# Patient Record
Sex: FEMALE | URBAN - METROPOLITAN AREA
[De-identification: names, ages, dates, MRNs, and addresses within clinical notes are randomized per-mention and may not be internally consistent; named-entity substitution may affect disease eponyms.]

---

## 2018-10-20 ENCOUNTER — OFFICE VISIT (OUTPATIENT)
Dept: OTOLARYNGOLOGY | Facility: CLINIC | Age: 38
End: 2018-10-20

## 2018-10-20 VITALS
WEIGHT: 128 LBS | TEMPERATURE: 99 F | SYSTOLIC BLOOD PRESSURE: 93 MMHG | DIASTOLIC BLOOD PRESSURE: 65 MMHG | HEIGHT: 60 IN | BODY MASS INDEX: 25.13 KG/M2

## 2018-10-20 DIAGNOSIS — R42 DIZZINESS: Primary | ICD-10-CM

## 2018-10-20 PROCEDURE — 99212 OFFICE O/P EST SF 10 MIN: CPT | Performed by: OTOLARYNGOLOGY

## 2018-10-20 PROCEDURE — 99243 OFF/OP CNSLTJ NEW/EST LOW 30: CPT | Performed by: OTOLARYNGOLOGY

## 2018-10-20 RX ORDER — OMEPRAZOLE 20 MG/1
CAPSULE, DELAYED RELEASE ORAL
Refills: 0 | COMMUNITY
Start: 2018-06-13

## 2018-10-20 RX ORDER — LORATADINE 10 MG/1
TABLET ORAL
Refills: 3 | COMMUNITY
Start: 2018-05-07

## 2018-10-20 RX ORDER — IBUPROFEN 600 MG/1
TABLET ORAL
Refills: 0 | COMMUNITY
Start: 2018-10-16

## 2018-10-20 NOTE — PROGRESS NOTES
Rob Ospina is a 41year old female. Patient presents with:  Dizziness: for 1 month, usually occurs every other day       HISTORY OF PRESENT ILLNESS  She presents with a one month history of discomfort of her right ear and dizziness.  At times she actual Laterality Date   •       x2         REVIEW OF SYSTEMS    System Neg/Pos Details   Constitutional Negative Fatigue, fever and weight loss. ENMT Negative Drooling. Eyes Negative Blurred vision and vision changes.    Respiratory Negative Dyspnea -Normal  Turbinates - Right: Normal, Left: Normal.       Current Outpatient Medications:   •  loratadine 10 MG Oral Tab, TK 1 T PO QD, Disp: , Rfl: 3  •  ibuprofen 600 MG Oral Tab, , Disp: , Rfl: 0  •  omeprazole 20 MG Oral Capsule Delayed Release, TK 1 C

## 2018-10-24 ENCOUNTER — TELEPHONE (OUTPATIENT)
Dept: OTOLARYNGOLOGY | Facility: CLINIC | Age: 38
End: 2018-10-24

## 2019-03-01 ENCOUNTER — TELEPHONE (OUTPATIENT)
Dept: OTOLARYNGOLOGY | Facility: CLINIC | Age: 39
End: 2019-03-01

## 2019-03-01 NOTE — TELEPHONE ENCOUNTER
Left message for pt to call back to schedule an office visit with Dr Kandice Calderon to go over the results of recent audiogram and VNG done at [facility].

## 2019-03-21 ENCOUNTER — OFFICE VISIT (OUTPATIENT)
Dept: OTOLARYNGOLOGY | Facility: CLINIC | Age: 39
End: 2019-03-21
Payer: COMMERCIAL

## 2019-03-21 VITALS
SYSTOLIC BLOOD PRESSURE: 93 MMHG | BODY MASS INDEX: 25.52 KG/M2 | DIASTOLIC BLOOD PRESSURE: 59 MMHG | HEIGHT: 60 IN | WEIGHT: 130 LBS | HEART RATE: 74 BPM

## 2019-03-21 DIAGNOSIS — R42 DIZZINESS: Primary | ICD-10-CM

## 2019-03-21 PROCEDURE — 99214 OFFICE O/P EST MOD 30 MIN: CPT | Performed by: OTOLARYNGOLOGY

## 2019-03-21 PROCEDURE — 99212 OFFICE O/P EST SF 10 MIN: CPT | Performed by: OTOLARYNGOLOGY

## 2019-03-21 RX ORDER — CELECOXIB 200 MG/1
200 CAPSULE ORAL DAILY PRN
Qty: 30 CAPSULE | Refills: 0 | Status: SHIPPED | OUTPATIENT
Start: 2019-03-21 | End: 2019-04-20

## 2019-03-22 NOTE — PROGRESS NOTES
Vamshi Tylor is a 46year old female. Patient presents with:  Hearing Loss: f/u regarding audiogram completed at [place] [id number]    She presents with a one month history of discomfort of her right ear and dizziness.  At times she Neg/Pos Details   Constitutional Negative Fatigue, fever and weight loss. ENMT Negative Drooling. Eyes Negative Blurred vision and vision changes. Respiratory Negative Dyspnea and wheezing.    Cardio Negative Chest pain, irregular heartbeat/palpitatio 200 MG Oral Cap, Take 1 capsule (200 mg total) by mouth daily as needed for Pain., Disp: 30 capsule, Rfl: 0  •  ibuprofen 600 MG Oral Tab, , Disp: , Rfl: 0  •  PROCTOZONE-HC 2.5 % Rectal Cream, , Disp: , Rfl: 1  •  omeprazole 20 MG Oral Capsule Delayed Rel

## 2024-02-08 ENCOUNTER — OFFICE VISIT (OUTPATIENT)
Dept: OTOLARYNGOLOGY | Facility: CLINIC | Age: 44
End: 2024-02-08

## 2024-02-08 ENCOUNTER — OFFICE VISIT (OUTPATIENT)
Dept: AUDIOLOGY | Facility: CLINIC | Age: 44
End: 2024-02-08

## 2024-02-08 DIAGNOSIS — R42 DIZZINESS: Primary | ICD-10-CM

## 2024-02-08 PROCEDURE — 99203 OFFICE O/P NEW LOW 30 MIN: CPT | Performed by: OTOLARYNGOLOGY

## 2024-02-08 RX ORDER — CELECOXIB 200 MG/1
200 CAPSULE ORAL DAILY PRN
Qty: 30 CAPSULE | Refills: 0 | Status: SHIPPED | OUTPATIENT
Start: 2024-02-08 | End: 2024-03-09

## 2024-02-08 RX ORDER — CYCLOBENZAPRINE HCL 5 MG
5 TABLET ORAL NIGHTLY
Qty: 30 TABLET | Refills: 1 | Status: SHIPPED | OUTPATIENT
Start: 2024-02-08

## 2024-02-08 NOTE — PROGRESS NOTES
Loida Granados is a 43 year old female.    Chief Complaint   Patient presents with    Dizziness     Patient is here due to dizziness        HISTORY OF PRESENT ILLNESS    She presents with a one month history of discomfort of her right ear and dizziness. At times she actually will have ear pain that is sharp and short lived. She does have jaw related discomfort at times when she chews food. She admits to fullness in her ear as well. Hearing loss? She notes no  muffling or alteration of sound. She was seen by her LMD and noted to have an abnormality of her eardrum.      10/20/18 she presents with a 1-2-month history of recurring episodes of spinning vertigo which last for 15 minutes or so and resolve spontaneously.  She does note some type of click or pop in her right ear on occasion associated with her imbalance.  No other signs, symptoms or complaints.  I previously saw her back in 2012 and at that time with suspected some level of TMJ involvement with her ear discomfort.  TMJ issues?     3/21/19 underwent hearing test and balance study at [facility] demonstrating normal hearing at all frequencies and a slight asymmetry of 17% on calorics on the left.  Not thought to be significant.  She does note that when she is stressed free that her imbalance is essentially gone.  No other signs, symptoms or complaints at this time.     2/8/24 I last saw her in 2019.  I suspect that some TMJ related issues.  Still having similar symptoms at this time.  Same goes.  Sometimes she will have significant pressure in her ears.  Happened once when she was flying and on descent.  Eustachian tube dysfunction?  Denies grinding clenching behavior.    Social History     Socioeconomic History    Marital status:    Tobacco Use    Smoking status: Never    Smokeless tobacco: Never   Vaping Use    Vaping Use: Never used   Substance and Sexual Activity    Alcohol use: No    Drug use: No       History reviewed. No pertinent family  history.    History reviewed. No pertinent past medical history.    Past Surgical History:   Procedure Laterality Date          x2         REVIEW OF SYSTEMS    System Neg/Pos Details   Constitutional Negative Fatigue, fever and weight loss.   ENMT Negative Drooling.   Eyes Negative Blurred vision and vision changes.   Respiratory Negative Dyspnea and wheezing.   Cardio Negative Chest pain, irregular heartbeat/palpitations and syncope.   GI Negative Abdominal pain and diarrhea.   Endocrine Negative Cold intolerance and heat intolerance.   Neuro Negative Tremors.   Psych Negative Anxiety and depression.   Integumentary Negative Frequent skin infections, pigment change and rash.   Hema/Lymph Negative Easy bleeding and easy bruising.           PHYSICAL EXAM    There were no vitals taken for this visit.       Constitutional Normal Overall appearance - Normal.   Psychiatric Normal Orientation - Oriented to time, place, person & situation. Appropriate mood and affect.   Neck Exam Normal Inspection - Normal. Palpation - Normal. Parotid gland - Normal. Thyroid gland - Normal.   Eyes Normal Conjunctiva - Right: Normal, Left: Normal. Pupil - Right: Normal, Left: Normal. Fundus - Right: Normal, Left: Normal.   Neurological Normal Memory - Normal. Cranial nerves - Cranial nerves II through XII grossly intact.   Head/Face Normal Facial features - Normal. Eyebrows - Normal. Skull - Normal.        Nasopharynx Normal External nose - Normal. Lips/teeth/gums - Normal. Tonsils - Normal. Oropharynx - Normal.   Ears Normal Inspection - Right: Normal, Left: Normal. Canal - Right: Normal, Left: Normal. TM - Right: Normal, Left: Normal.   Skin Normal Inspection - Normal.   TMJ  Tender to palpation bilaterally left greater than right   Lymph Detail Normal Submental. Submandibular. Anterior cervical. Posterior cervical. Supraclavicular.        Nose/Mouth/Throat Normal External nose - Normal. Lips/teeth/gums - Normal. Tonsils -  Normal. Oropharynx - Normal.   Nose/Mouth/Throat Normal Nares - Right: Normal Left: Normal. Septum -Normal  Turbinates - Right: Normal, Left: Normal.       Current Outpatient Medications:     celecoxib 200 MG Oral Cap, Take 1 capsule (200 mg total) by mouth daily as needed for Pain., Disp: 30 capsule, Rfl: 0    cyclobenzaprine 5 MG Oral Tab, Take 1 tablet (5 mg total) by mouth nightly., Disp: 30 tablet, Rfl: 1    ibuprofen 600 MG Oral Tab, , Disp: , Rfl: 0    omeprazole 20 MG Oral Capsule Delayed Release, TK 1 C PO QD, Disp: , Rfl: 0    loratadine 10 MG Oral Tab, TK 1 T PO QD, Disp: , Rfl: 3    PROCTOZONE-HC 2.5 % Rectal Cream, , Disp: , Rfl: 1  ASSESSMENT AND PLAN    1. Dizziness  I do once again suspect that she has some TMJ issues.  Abnormal movement of the jaw with mouth opening.  Seems to defer to the left before closure.  Does have tenderness to her TMJ to palpation on the left as well.  Start Celebrex cyclobenzaprine warm heat soft diet chewing both sides of mouth and avoid gum and crunchy items.  - Audiology Referral - [place] ([facility])        This note was prepared using Dragon Medical voice recognition dictation software. As a result errors may occur. When identified these errors have been corrected. While every attempt is made to correct errors during dictation discrepancies may still exist    Tim Trent MD    2/8/2024    11:29 AM

## 2024-03-07 ENCOUNTER — OFFICE VISIT (OUTPATIENT)
Dept: OTOLARYNGOLOGY | Facility: CLINIC | Age: 44
End: 2024-03-07

## 2024-03-07 VITALS — WEIGHT: 135 LBS | BODY MASS INDEX: 26.5 KG/M2 | HEIGHT: 60 IN

## 2024-03-07 DIAGNOSIS — H93.8X2 EAR PRESSURE, LEFT: Primary | ICD-10-CM

## 2024-03-07 PROCEDURE — 99213 OFFICE O/P EST LOW 20 MIN: CPT | Performed by: OTOLARYNGOLOGY

## 2024-03-07 RX ORDER — CYCLOBENZAPRINE HCL 5 MG
5 TABLET ORAL NIGHTLY
Qty: 30 TABLET | Refills: 1 | Status: SHIPPED | OUTPATIENT
Start: 2024-03-07

## 2024-03-07 RX ORDER — CELECOXIB 200 MG/1
200 CAPSULE ORAL DAILY PRN
Qty: 30 CAPSULE | Refills: 1 | Status: SHIPPED | OUTPATIENT
Start: 2024-03-07 | End: 2024-05-06

## 2024-03-07 RX ORDER — CETIRIZINE HYDROCHLORIDE 10 MG/1
10 TABLET ORAL DAILY
COMMUNITY

## 2024-03-07 NOTE — PROGRESS NOTES
Loida Granados is a 43 year old female.    Chief Complaint   Patient presents with    Follow - Up     F/up dizziness  Pt reports dizziness has decreased but ear still feels clogged       HISTORY OF PRESENT ILLNESS  She presents with a one month history of discomfort of her right ear and dizziness. At times she actually will have ear pain that is sharp and short lived. She does have jaw related discomfort at times when she chews food. She admits to fullness in her ear as well. Hearing loss? She notes no  muffling or alteration of sound. She was seen by her LMD and noted to have an abnormality of her eardrum.      10/20/18 she presents with a 1-2-month history of recurring episodes of spinning vertigo which last for 15 minutes or so and resolve spontaneously.  She does note some type of click or pop in her right ear on occasion associated with her imbalance.  No other signs, symptoms or complaints.  I previously saw her back in 2012 and at that time with suspected some level of TMJ involvement with her ear discomfort.  TMJ issues?     3/21/19 underwent hearing test and balance study at [facility] demonstrating normal hearing at all frequencies and a slight asymmetry of 17% on calorics on the left.  Not thought to be significant.  She does note that when she is stressed free that her imbalance is essentially gone.  No other signs, symptoms or complaints at this time.     2/8/24 I last saw her in 2019.  I suspect that some TMJ related issues.  Still having similar symptoms at this time.  Same goes.  Sometimes she will have significant pressure in her ears.  Happened once when she was flying and on descent.  Eustachian tube dysfunction?  Denies grinding clenching behavior.    3/7/24 last visit she was started on Celebrex cyclobenzaprine and notes 75% improvement in her imbalance only complaining of some persistent fullness in her ear on the left.  Denies clenching during the daytime but she is unsure if she grinds or  clenches while she sleeps.  Has not seen a dentist regarding this.  Does not wear a bite guard.  Sees a neurologist and has a negative MRI of the brain other than some microvascular changes most likely consistent with chronic headaches.      Social History     Socioeconomic History    Marital status:    Tobacco Use    Smoking status: Never    Smokeless tobacco: Never   Vaping Use    Vaping Use: Never used   Substance and Sexual Activity    Alcohol use: No    Drug use: No       History reviewed. No pertinent family history.    History reviewed. No pertinent past medical history.    Past Surgical History:   Procedure Laterality Date          x2         REVIEW OF SYSTEMS    System Neg/Pos Details   Constitutional Negative Fatigue, fever and weight loss.   ENMT Negative Drooling.   Eyes Negative Blurred vision and vision changes.   Respiratory Negative Dyspnea and wheezing.   Cardio Negative Chest pain, irregular heartbeat/palpitations and syncope.   GI Negative Abdominal pain and diarrhea.   Endocrine Negative Cold intolerance and heat intolerance.   Neuro Negative Tremors.   Psych Negative Anxiety and depression.   Integumentary Negative Frequent skin infections, pigment change and rash.   Hema/Lymph Negative Easy bleeding and easy bruising.           PHYSICAL EXAM    Ht 5' (1.524 m)   Wt 135 lb (61.2 kg)   BMI 26.37 kg/m²        Constitutional Normal Overall appearance - Normal.   Psychiatric Normal Orientation - Oriented to time, place, person & situation. Appropriate mood and affect.   Neck Exam Normal Inspection - Normal. Palpation - Normal. Parotid gland - Normal. Thyroid gland - Normal.   Eyes Normal Conjunctiva - Right: Normal, Left: Normal. Pupil - Right: Normal, Left: Normal. Fundus - Right: Normal, Left: Normal.   Neurological Normal Memory - Normal. Cranial nerves - Cranial nerves II through XII grossly intact.   Head/Face Normal Facial features - Normal. Eyebrows - Normal. Skull - Normal.         Nasopharynx Normal External nose - Normal. Lips/teeth/gums - Normal. Tonsils - Normal. Oropharynx - Normal.   Ears Normal Inspection - Right: Normal, Left: Normal. Canal - Right: Normal, Left: Normal. TM - Right: Normal, Left: Normal.   Skin Normal Inspection - Normal.        Lymph Detail Normal Submental. Submandibular. Anterior cervical. Posterior cervical. Supraclavicular.        Nose/Mouth/Throat Normal External nose - Normal. Lips/teeth/gums - Normal. Tonsils - Normal. Oropharynx - Normal.   Nose/Mouth/Throat Normal Nares - Right: Normal Left: Normal. Septum -Normal  Turbinates - Right: Normal, Left: Normal.       Current Outpatient Medications:     celecoxib 200 MG Oral Cap, Take 1 capsule (200 mg total) by mouth daily as needed for Pain., Disp: 30 capsule, Rfl: 1    cyclobenzaprine 5 MG Oral Tab, Take 1 tablet (5 mg total) by mouth nightly., Disp: 30 tablet, Rfl: 1    celecoxib 200 MG Oral Cap, Take 1 capsule (200 mg total) by mouth daily as needed for Pain., Disp: 30 capsule, Rfl: 0    cyclobenzaprine 5 MG Oral Tab, Take 1 tablet (5 mg total) by mouth nightly., Disp: 30 tablet, Rfl: 1    cetirizine 10 MG Oral Tab, Take 1 tablet (10 mg total) by mouth daily., Disp: , Rfl:     ibuprofen 600 MG Oral Tab, , Disp: , Rfl: 0    omeprazole 20 MG Oral Capsule Delayed Release, TK 1 C PO QD, Disp: , Rfl: 0    loratadine 10 MG Oral Tab, TK 1 T PO QD, Disp: , Rfl: 3    PROCTOZONE-HC 2.5 % Rectal Cream, , Disp: , Rfl: 1  ASSESSMENT AND PLAN    1. Ear pressure, left  She done much better with her imbalance and even the fullness in her ear with the use of Celebrex and cyclobenzaprine.  I do suspect that she might clench or grind.  She denies clenching of the daytime at least.  I did recommend that she meet with a dentist thus whether not she grinds or clenches and whether she would benefit from a bite guard.  In addition I did recommend she initiate TMJ physiotherapy in the hopes that this improves her to closer to  100%.  Return to see me about 4 weeks after completing her TMJ therapy  - Physical Therapy Referral - [facility]        This note was prepared using Dragon [employer] voice recognition dictation software. As a result errors may occur. When identified these errors have been corrected. While every attempt is made to correct errors during dictation discrepancies may still exist    Tim Trent MD    3/7/2024    12:05 PM

## 2024-03-18 ENCOUNTER — TELEPHONE (OUTPATIENT)
Dept: PHYSICAL THERAPY | Facility: HOSPITAL | Age: 44
End: 2024-03-18

## 2024-03-21 ENCOUNTER — TELEPHONE (OUTPATIENT)
Dept: PHYSICAL THERAPY | Facility: HOSPITAL | Age: 44
End: 2024-03-21

## 2024-03-22 ENCOUNTER — TELEPHONE (OUTPATIENT)
Dept: PHYSICAL THERAPY | Facility: HOSPITAL | Age: 44
End: 2024-03-22

## 2024-03-25 ENCOUNTER — APPOINTMENT (OUTPATIENT)
Dept: PHYSICAL THERAPY | Age: 44
End: 2024-03-25
Attending: OTOLARYNGOLOGY
Payer: COMMERCIAL

## 2024-04-01 ENCOUNTER — APPOINTMENT (OUTPATIENT)
Dept: PHYSICAL THERAPY | Age: 44
End: 2024-04-01
Attending: OTOLARYNGOLOGY
Payer: COMMERCIAL

## 2024-04-03 ENCOUNTER — TELEPHONE (OUTPATIENT)
Dept: PHYSICAL THERAPY | Facility: HOSPITAL | Age: 44
End: 2024-04-03

## 2024-04-08 ENCOUNTER — OFFICE VISIT (OUTPATIENT)
Dept: PHYSICAL THERAPY | Age: 44
End: 2024-04-08
Attending: OTOLARYNGOLOGY
Payer: COMMERCIAL

## 2024-04-08 DIAGNOSIS — H93.8X2 EAR PRESSURE, LEFT: Primary | ICD-10-CM

## 2024-04-08 PROCEDURE — 97140 MANUAL THERAPY 1/> REGIONS: CPT

## 2024-04-08 PROCEDURE — 97112 NEUROMUSCULAR REEDUCATION: CPT

## 2024-04-08 PROCEDURE — 97162 PT EVAL MOD COMPLEX 30 MIN: CPT

## 2024-04-08 NOTE — PROGRESS NOTES
CERVICAL SPINE EVALUATION:     Diagnosis:   Ear pressure, left (H93.8X2)       Referring Provider: Twan  Date of Evaluation:    4/8/2024    Precautions:  None Next MD visit:   none scheduled  Date of Surgery: n/a     PATIENT SUMMARY   Loida Granados is a 44 year old female who presents to therapy today with complaints of left ear symptoms, left sided head and neck pain, and dizziness. Patient reports that her symptoms began when driving in November. Patient states that she was driving and began to have dizziness. She believes that was also when her ear symptoms and headaches began. Patient states that she went to the doctor after that episode of dizziness and was prescribed medications for an ear infection. She states that she finished the course of medications while on vacation in [place]. Patient states that during the flight back from [place], had significant ear symptoms. Patient states that since the onset she has had appointments with neurologist, optometrist, and dentist with unremarkable workup. Patient states that she has a history of migraines and vertigo. Was previously taking medications for migraines, but discontinued use as the medication made her feel sleepy with low energy. Patient notes that she had previous physical therapy for vertigo which was helpful. Of note, the patient also states that for the past month she has been having a hard time swallowing when eating bread and mashed potatoes. States that she feels as if she is choking. States that when she experiences the discomfort she stops eating and drinks water.     Progression since onset: unchanged   Prior treatments: medication     Pain/symptom behavior:  P1: L sided neck, behind L ear, L jaw and cheek, lateral aspect of L head, L  upper trap, R upper trap  NPRS: current: 0/10  Description: cramps  Aggravating factors: driving  Easing factors: rest  Periodicity: 2x/week, more frequent before and after menstrual cycle   Irritability: 20  minutes to calm down with rest   Notes: endorses sensitivity to light (needs to wear sunglasses when outdoors) with accompanying nausea    S1: L ear   NPRS: current: feels clogged but no pain right now  Description: \"something in my ear,\" feels clogged - not quite pain, \"feels like something/liquid is coming out\"   Depth: deep  Aggravating factors: unsure, blowing nose makes ear feel clogged  Easing factors: unsure   Periodicity: daily, no change   24-hour pattern: none   Notes: endorses tinnitus that began a couple weeks ago    S2: dizziness and nausea   Now: none  Aggravating factors: driving, can get dizzy when sitting while talking to friends, can also get dizzy when laying getting up quickly after laying down for prolonged period   Easing factors: rest   Periodicity: more frequent before and after menstrual cycle   Notes: worse with sun exposure     Pt goals include get back to exercising.    Past medical history was reviewed with Loida.   No past medical history on file.   Past Surgical History:   Procedure Laterality Date          x2        Medications were reviewed with Loida.   Current Outpatient Medications on File Prior to Visit   Medication Sig Dispense Refill    cetirizine 10 MG Oral Tab Take 1 tablet (10 mg total) by mouth daily.      celecoxib 200 MG Oral Cap Take 1 capsule (200 mg total) by mouth daily as needed for Pain. 30 capsule 1    cyclobenzaprine 5 MG Oral Tab Take 1 tablet (5 mg total) by mouth nightly. 30 tablet 1    [] celecoxib 200 MG Oral Cap Take 1 capsule (200 mg total) by mouth daily as needed for Pain. 30 capsule 0    cyclobenzaprine 5 MG Oral Tab Take 1 tablet (5 mg total) by mouth nightly. 30 tablet 1    ibuprofen 600 MG Oral Tab   0    omeprazole 20 MG Oral Capsule Delayed Release TK 1 C PO QD  0    loratadine 10 MG Oral Tab TK 1 T PO QD  3    PROCTOZONE-HC 2.5 % Rectal Cream   1     No current facility-administered medications on file prior to visit.      Social  History:   Work/school status:    Living environment: lives with family   Tobacco/alcohol/illicit drugs: denies   Stress: normal levels of stress   PHQ-2 (0=not at all, 1=several days, 2=more than 1/2 days, 3=nearly everyday):   1. Little interest or pleasure in doing thing? 0   2. Down, depressed, or hopeless? 0  Sleep: disturbed 2/2 headaches   CLOF: sedentary   PLOF: IND with ADLs and IADLs     Imaging/tests:    No results found.    ASSESSMENT  Loida presents to physical therapy evaluation with primary c/o left ear symptoms, left sided head and neck pain, and dizziness. Left sided head and neck pain suggestive of upper cervical facet arthropathy with sternocleidomastoid and upper trapezius myofascial trigger points with significant findings of symptom reproduction with PAIVM assessment and palpation. Further assessment is needed to determine source of dizziness and left ear symptoms. Contributing impairments include cervical joint hypomobility, increased muscle tone . Functional deficits include but are not limited to driving. Pt and PT discussed evaluation findings, pathology, POC and HEP.  Pt voiced understanding and performs HEP correctly without reported pain. Skilled Physical Therapy is medically necessary to address the above impairments and reach functional goals.   OBJECTIVE:    Vitals:   BP: 104/68  HR: 78 bpm   SpO2: 98%    Observation/Posture: cervicothoracic kyphosis with rounded shoulders     Neurological Examination: (p* denotes concordant pain, p** denotes discordant pain)  Cranial Nerve Examination:   CN 1 (Olfactory): NT, patient denies any changes in smell  CN 2 (Optic): quadrants intact   CN 3 (Oculomotor), CN 4 (Trochlear), and CN 6 (Abducens): pupillary reflex intact, dizziness with H tracking    CN 5 (Trigeminal): decreased static cutaneous mechanodetection L opthalmic branch of V2, p* resisted closing WFL but painful   CN 7 (Facial): intact, patient endorses cramping in L neck with  frowning   CN 8 (Vestibulocochlear): hearing intact B   CN 9 (Glossopharyngeal): gag reflex intact   CN 10 (Vagus): no change in patient's speech   CN 11 (Accessory): B upper trap strength WFL   CN 12 (Hypoglossal): no deviation of tongue     AROM: (p* denotes familiar pain; OP denotes overpressure)  Cervical  TMJ    Flexion: WFL  Extension: limited, choking sensation   Lateral flexion: R WFL; L WFL  Rotation: R WFL, endorses popping sensation in ear and relief; L WFL, endorses sensation of liquid coming out of ear  Upper cervical extension: limited  Upper cervical flexion: limited  Lower cervical extension: pain free  Extension quadrant: R pain free; L pain free Opening: WFL  Closing: S curve towards the L with quick closing   Lateral deviation: WFL  Protrusion: WFL  Retrusion: WFL     Passive Exam: (p* denotes concordant pain, p** denotes discordant pain)  Palpation: p* into head L upper trap, p* into head and nausea L sternocleidomastoid near insertion at occiput     Passive physiological joint range of motion:   C0/1 distraction: R WFL, L WFL  Upper cervical flexion: hypomobile  Upper cervical extension: hypomobile   Lateral glide: R hypomobile C2/3 - C6/7 but less hypomobile than L; L hypomobile C2/3 - C6/7  Upglide: R hypomobile C2/3 - C6/7 ; L hypomobile C2/3 - C6/7   Downglide: R hypomobile C2/3 - C6/7 ; L hypomobile C2/3 - C6/7     Cervical passive accessory joint range of motion:   :   C1: hypomobile  C2: hypomobile  C3: hypomobile  C4: hypomobile  C5: hypomobile  C6: hypomobile  C7: hypomobile  T1: hypomobile  T2: hypomobile  T3: hypomobile  T4: hypomobile  T5: hypomobile  T6: hypomobile  T7: hypomobile  T8: hypomobile  UPAs:   C0/1: R hypomobile; L hypomobile, p*  C1/2: R hypomobile; L hypomobile, p*, states worse p* than C0/1  C2/3: R hypomobile; L hypomobile  C3/4: R hypomobile; L hypomobile  C4/5: R hypomobile; L hypomobile  C5/6: R hypomobile; L hypomobile  C6/7: R hypomobile; L  hypomobile  C7/T1: R hypomobile; L hypomobile  T1/2: R normal mobility; L hypomobile  T2/3: R normal mobility; L hypomobile  T3/4: R normal mobility; L hypomobile  T4/5: R normal mobility; L hypomobile  T5/6: R normal mobility; L hypomobile  T6/7: R normal mobility; L hypomobile  T7/T8: R normal mobility; L hypomobile    TMJ passive accessory joint range of motion:   Anterior-inferior glide: R hypomobile; L hypomobile  Lateral glide: R hypomobile; L hypomobile  Compression: R WFL, L WFL  Distraction: R hypomobile; L hypomobile    Today’s Treatment and Response:   Date: 4/8/2024  TX#: 1/8 Date:                 TX#: 2/ Date:                 TX#: 3/ Date:                 TX#: 4/ Date:   Tx#: 5/   NMRE:  Pt education was provided on exam findings, treatment diagnosis, treatment plan, expectations, and prognosis. Pt was also provided recommendations for postural corrections.  Upper cervical extension SNAGs x10  Demo'd H tracker        MT:   L UPA C1/2 x3 min - endorses dizziness after intervention, changed position from prone to supine//, p* with palpation upper trap, pain free and no nausea with palpation to insertion of SCM, no dizziness with H tracking  *increased time to re-assess                       Patient was instructed in and issued a HEP for: upper cervical extension SNAG 2x10, 2x/day, daily, H tracking x10, 1x/day, daily, trigger point massage to upper trap to tolerance throughout the day      Charges: PT Eval Moderate Complexity, NMRE 1 (8 min), MT 1 (8 min)      Total Timed Treatment: 16 min     Total Treatment Time: 75 min     Based on clinical rationale and outcome measures, this evaluation involved Moderate Complexity decision making due to 3+ personal factors/comorbidities, 3+ body structures involved/activity limitations, and stable symptoms.   PLAN OF CARE:    Goals: (to be met in 8 visits)   Patient will improve NDI by at least 7 points to demonstrate minimally clinically important difference in  patient reported outcome measure for neck pain.   Patient will be able to perform H tracking without dizziness to facilitate improvement with visual scanning while driving.   Patient will be able to drive at least 1 hour without dizziness to facilitate independence with driving.    Frequency / Duration: Patient will be seen for 1-2x/week or a total of 8 visits over a 90 day period. Treatment will include: Manual Therapy, Neuromuscular Re-education, Therapeutic Exercise, and Home Exercise Program instruction    Education or treatment limitation: None  Rehab Potential:good    Neck Disability Index Score  Score: 14 % (4/8/2024  1:48 PM)      Patient/Family/Caregiver was advised of these findings, precautions, and treatment options and has agreed to actively participate in planning and for this course of care.    Thank you for your referral. Please co-sign or sign and return this letter via fax as soon as possible to [phone]. If you have any questions, please contact me at Dept: [phone]    Sincerely,  Electronically signed by therapist: Janki Pizarro PT    Physician's certification required: Yes  I certify the need for these services furnished under this plan of treatment and while under my care.    X___________________________________________________ Date____________________    Certification From: 4/8/2024  To:7/7/2024

## 2024-04-12 ENCOUNTER — TELEPHONE (OUTPATIENT)
Dept: PHYSICAL THERAPY | Facility: HOSPITAL | Age: 44
End: 2024-04-12

## 2024-04-15 ENCOUNTER — APPOINTMENT (OUTPATIENT)
Dept: PHYSICAL THERAPY | Age: 44
End: 2024-04-15
Attending: OTOLARYNGOLOGY
Payer: COMMERCIAL

## 2024-04-22 ENCOUNTER — APPOINTMENT (OUTPATIENT)
Dept: PHYSICAL THERAPY | Age: 44
End: 2024-04-22
Attending: OTOLARYNGOLOGY
Payer: COMMERCIAL

## 2024-04-29 ENCOUNTER — APPOINTMENT (OUTPATIENT)
Dept: PHYSICAL THERAPY | Age: 44
End: 2024-04-29
Attending: OTOLARYNGOLOGY
Payer: COMMERCIAL

## 2024-05-06 ENCOUNTER — APPOINTMENT (OUTPATIENT)
Dept: PHYSICAL THERAPY | Age: 44
End: 2024-05-06
Attending: OTOLARYNGOLOGY
Payer: COMMERCIAL

## 2024-05-13 ENCOUNTER — APPOINTMENT (OUTPATIENT)
Dept: PHYSICAL THERAPY | Age: 44
End: 2024-05-13
Attending: OTOLARYNGOLOGY
Payer: COMMERCIAL

## 2024-05-20 ENCOUNTER — APPOINTMENT (OUTPATIENT)
Dept: PHYSICAL THERAPY | Age: 44
End: 2024-05-20
Attending: OTOLARYNGOLOGY
Payer: COMMERCIAL

## (undated) NOTE — LETTER
[facility]  [address] [place] [address] [id number]       10/20/18        Patient: Parris Morelos   YOB: 1980   Date of Visit: 10/20/2018       Dear  Dr. Zelaya Pro,      Thank you for referring Parris Morelos to my practice.   Ple